# Patient Record
Sex: FEMALE | Race: WHITE | NOT HISPANIC OR LATINO | Employment: OTHER | URBAN - METROPOLITAN AREA
[De-identification: names, ages, dates, MRNs, and addresses within clinical notes are randomized per-mention and may not be internally consistent; named-entity substitution may affect disease eponyms.]

---

## 2023-03-08 ENCOUNTER — TELEPHONE (OUTPATIENT)
Dept: PRIMARY CARE | Facility: CLINIC | Age: 53
End: 2023-03-08
Payer: COMMERCIAL

## 2023-03-08 DIAGNOSIS — E27.1 ADDISON DISEASE (MULTI): Primary | ICD-10-CM

## 2023-03-08 NOTE — TELEPHONE ENCOUNTER
Pt left message that she has not been feeling well and asking for the solumedrol 125mg be sent in, the one that has liquid in it.

## 2023-03-09 ENCOUNTER — TELEPHONE (OUTPATIENT)
Dept: PRIMARY CARE | Facility: CLINIC | Age: 53
End: 2023-03-09
Payer: COMMERCIAL

## 2023-03-09 PROBLEM — N12 PYELONEPHRITIS: Status: ACTIVE | Noted: 2023-03-09

## 2023-03-09 PROBLEM — E03.8 HYPOTHYROIDISM DUE TO HASHIMOTO'S THYROIDITIS: Status: ACTIVE | Noted: 2023-03-09

## 2023-03-09 PROBLEM — I21.A1 TYPE 2 MYOCARDIAL INFARCTION WITHOUT ST ELEVATION (MULTI): Status: ACTIVE | Noted: 2023-03-09

## 2023-03-09 PROBLEM — E61.1 IRON DEFICIENCY: Status: ACTIVE | Noted: 2023-03-09

## 2023-03-09 PROBLEM — F43.0 PANIC ATTACK AS REACTION TO STRESS: Status: ACTIVE | Noted: 2023-03-09

## 2023-03-09 PROBLEM — E55.9 VITAMIN D DEFICIENCY: Status: ACTIVE | Noted: 2023-03-09

## 2023-03-09 PROBLEM — E27.1 ADDISON DISEASE (MULTI): Status: ACTIVE | Noted: 2023-03-09

## 2023-03-09 PROBLEM — R53.81 MALAISE AND FATIGUE: Status: ACTIVE | Noted: 2023-03-09

## 2023-03-09 PROBLEM — R79.89 ELEVATED SERUM CREATININE: Status: ACTIVE | Noted: 2023-03-09

## 2023-03-09 PROBLEM — E06.3 HYPOTHYROIDISM DUE TO HASHIMOTO'S THYROIDITIS: Status: ACTIVE | Noted: 2023-03-09

## 2023-03-09 PROBLEM — F51.5 NIGHTMARE DISORDER: Status: ACTIVE | Noted: 2023-03-09

## 2023-03-09 PROBLEM — D72.820 LYMPHOCYTOSIS: Status: ACTIVE | Noted: 2023-03-09

## 2023-03-09 PROBLEM — M54.31 BILATERAL SCIATICA: Status: ACTIVE | Noted: 2023-03-09

## 2023-03-09 PROBLEM — F32.2 SEVERE MAJOR DEPRESSION (MULTI): Status: ACTIVE | Noted: 2023-03-09

## 2023-03-09 PROBLEM — M54.32 BILATERAL SCIATICA: Status: ACTIVE | Noted: 2023-03-09

## 2023-03-09 PROBLEM — F41.0 PANIC ATTACK AS REACTION TO STRESS: Status: ACTIVE | Noted: 2023-03-09

## 2023-03-09 PROBLEM — R53.83 MALAISE AND FATIGUE: Status: ACTIVE | Noted: 2023-03-09

## 2023-03-09 PROBLEM — E87.6 HYPOKALEMIA: Status: ACTIVE | Noted: 2023-03-09

## 2023-03-09 RX ORDER — SERTRALINE HYDROCHLORIDE 50 MG/1
1 TABLET, FILM COATED ORAL DAILY
COMMUNITY
Start: 2021-05-05 | End: 2023-08-02 | Stop reason: ALTCHOICE

## 2023-03-09 RX ORDER — FLUDROCORTISONE ACETATE 0.1 MG/1
TABLET ORAL
COMMUNITY
End: 2024-01-22 | Stop reason: SDUPTHER

## 2023-03-09 RX ORDER — LEVOTHYROXINE SODIUM 150 UG/1
150 TABLET ORAL DAILY
COMMUNITY
End: 2023-09-27 | Stop reason: SDUPTHER

## 2023-03-09 RX ORDER — METHYLPREDNISOLONE SODIUM SUCCINATE 125 MG/2ML
125 INJECTION INTRAMUSCULAR; INTRAVENOUS ONCE
Qty: 2 ML | Refills: 3 | Status: SHIPPED | OUTPATIENT
Start: 2023-03-09 | End: 2023-03-09

## 2023-03-09 RX ORDER — VIT C/E/ZN/COPPR/LUTEIN/ZEAXAN 250MG-90MG
CAPSULE ORAL
COMMUNITY
End: 2023-11-16 | Stop reason: SDUPTHER

## 2023-03-09 RX ORDER — HYDROCORTISONE 5 MG/1
5 TABLET ORAL
COMMUNITY
End: 2023-08-02 | Stop reason: SDUPTHER

## 2023-03-09 RX ORDER — CYCLOBENZAPRINE HCL 10 MG
10 TABLET ORAL 3 TIMES DAILY PRN
COMMUNITY
End: 2023-07-03 | Stop reason: SDUPTHER

## 2023-03-09 RX ORDER — ERGOCALCIFEROL 1.25 MG/1
1 CAPSULE ORAL
COMMUNITY
Start: 2022-05-19 | End: 2023-11-16 | Stop reason: ALTCHOICE

## 2023-03-09 NOTE — TELEPHONE ENCOUNTER
Pt left message that she was at hospital and she has ulcerative colitis. They gave her an atb. Asking your suggestions if she needs to increase probiotic or anything else. Please advise.   Drysol Pregnancy And Lactation Text: This medication is considered safe during pregnancy and breast feeding.

## 2023-03-15 NOTE — TELEPHONE ENCOUNTER
Called Amanda and made sure she got my message that I left her. She did and said thank you for checking

## 2023-05-11 ENCOUNTER — TELEPHONE (OUTPATIENT)
Dept: PRIMARY CARE | Facility: CLINIC | Age: 53
End: 2023-05-11
Payer: COMMERCIAL

## 2023-05-11 DIAGNOSIS — J40 BRONCHITIS: Primary | ICD-10-CM

## 2023-05-11 NOTE — TELEPHONE ENCOUNTER
Abiodun left a message she states she is not feeling well she has a fever of 101 and is very congested, she is wondering if you would send something in for her?

## 2023-05-12 RX ORDER — ALBUTEROL SULFATE 90 UG/1
2 AEROSOL, METERED RESPIRATORY (INHALATION) EVERY 4 HOURS PRN
Qty: 8 G | Refills: 5 | Status: SHIPPED | OUTPATIENT
Start: 2023-05-12 | End: 2023-11-16 | Stop reason: ALTCHOICE

## 2023-05-12 RX ORDER — PREDNISONE 10 MG/1
TABLET ORAL
Qty: 42 TABLET | Refills: 0 | Status: SHIPPED | OUTPATIENT
Start: 2023-05-12 | End: 2023-05-24

## 2023-05-12 RX ORDER — BENZONATATE 200 MG/1
200 CAPSULE ORAL 3 TIMES DAILY PRN
Qty: 42 CAPSULE | Refills: 0 | Status: SHIPPED | OUTPATIENT
Start: 2023-05-12 | End: 2023-06-11

## 2023-05-12 RX ORDER — DOXYCYCLINE 100 MG/1
100 CAPSULE ORAL 2 TIMES DAILY
Qty: 20 CAPSULE | Refills: 0 | Status: SHIPPED | OUTPATIENT
Start: 2023-05-12 | End: 2023-05-22

## 2023-07-03 DIAGNOSIS — M54.31 BILATERAL SCIATICA: Primary | ICD-10-CM

## 2023-07-03 DIAGNOSIS — M54.32 BILATERAL SCIATICA: Primary | ICD-10-CM

## 2023-07-03 RX ORDER — CYCLOBENZAPRINE HCL 10 MG
10 TABLET ORAL 3 TIMES DAILY PRN
Qty: 90 TABLET | Refills: 0 | Status: SHIPPED | OUTPATIENT
Start: 2023-07-03 | End: 2023-11-16 | Stop reason: ALTCHOICE

## 2023-07-16 DIAGNOSIS — E27.1 ADDISON DISEASE (MULTI): Primary | ICD-10-CM

## 2023-07-16 DIAGNOSIS — M79.89 BILATERAL HAND SWELLING: ICD-10-CM

## 2023-07-16 RX ORDER — PREDNISONE 10 MG/1
TABLET ORAL
Qty: 21 TABLET | Refills: 0 | Status: SHIPPED | OUTPATIENT
Start: 2023-07-16 | End: 2023-07-22

## 2023-08-02 ENCOUNTER — OFFICE VISIT (OUTPATIENT)
Dept: PRIMARY CARE | Facility: CLINIC | Age: 53
End: 2023-08-02
Payer: COMMERCIAL

## 2023-08-02 ENCOUNTER — TELEPHONE (OUTPATIENT)
Dept: PRIMARY CARE | Facility: CLINIC | Age: 53
End: 2023-08-02

## 2023-08-02 VITALS
WEIGHT: 173 LBS | BODY MASS INDEX: 29.53 KG/M2 | SYSTOLIC BLOOD PRESSURE: 108 MMHG | OXYGEN SATURATION: 99 % | HEART RATE: 90 BPM | DIASTOLIC BLOOD PRESSURE: 78 MMHG | HEIGHT: 64 IN

## 2023-08-02 DIAGNOSIS — Z00.00 WELL ADULT EXAM: Primary | ICD-10-CM

## 2023-08-02 DIAGNOSIS — E27.1 ADDISON DISEASE (MULTI): ICD-10-CM

## 2023-08-02 DIAGNOSIS — Z12.31 BREAST CANCER SCREENING BY MAMMOGRAM: ICD-10-CM

## 2023-08-02 PROCEDURE — 99396 PREV VISIT EST AGE 40-64: CPT | Performed by: FAMILY MEDICINE

## 2023-08-02 RX ORDER — HYDROCORTISONE 5 MG/1
5 TABLET ORAL 3 TIMES DAILY
Qty: 90 TABLET | Refills: 11 | Status: SHIPPED | OUTPATIENT
Start: 2023-08-02 | End: 2024-02-25 | Stop reason: SDUPTHER

## 2023-08-02 RX ORDER — METHENAMINE HIPPURATE 1000 MG/1
1 TABLET ORAL 2 TIMES DAILY
COMMUNITY
End: 2023-08-02 | Stop reason: ALTCHOICE

## 2023-08-02 RX ORDER — METOCLOPRAMIDE 10 MG/1
10 TABLET ORAL 3 TIMES DAILY PRN
COMMUNITY
Start: 2023-03-09 | End: 2023-08-02 | Stop reason: ALTCHOICE

## 2023-08-02 NOTE — PROGRESS NOTES
Subjective   Patient ID: Amanda Hammonds is a 53 y.o. female who presents for Hand Pain.  HPI  Patient has had tingling and rash x 2 weeks.  She was on prednisone without relief.  She had peeling of the hands and foot.   It is improving.      Mammogram ordered  DEXA was ordered  Colonsocopy was done 2017    Diet: eating less than 1200 ravi   Review of Systems    Objective   Physical Exam  General: Patient is alert and oriented ×3 and appears in no acute distress. No respiratory distress.    Head: Atraumatic normocephalic.    Eyes: EOMI, PERRLA      Ears: Canals patent without any irritation, tympanic membranes without inflammation, no swelling, normal light reflex.    Nose: Nares patent. Turbinates are not swollen. No discharge.    Mouth: Normal mucosa. Moist. No erythema, exudates, tonsillar enlargement.    Neck: Normal range of motion, no masses.  Thyroid is palpable and normal in size without any nodules. No anterior cervical or posterior cervical adenopathy.    Heart: Regular rate and rhythm, no murmurs clicks or gallops    Lungs: Clear to auscultation bilaterally without any rhonchi rales or wheezing, lung sounds heard throughout all lung fields    Abdomen: Soft, nontender, no rigidity, rebound, guarding or organomegaly. Bowel sounds ×4 quadrants.    Musculoskeletal: Normal range of motion, strength is grossly intact in the proximal distal muscles of the upper and lower extremities bilaterally, deep tendon reflexes +2 out of 4 and symmetric bilaterally at the patella, Achilles, biceps, triceps, sensation intact.    Nerves: Cranial nerves II through XII appear grossly intact and without deficit    Skin: Intact, dry, no rashes or erythema    Psych: Normal affect.  Assessment/Plan   Problem List Items Addressed This Visit       [place] disease (CMS/HCC)    Relevant Medications    hydrocortisone (Cortef) 5 mg tablet     Other Visit Diagnoses       Well adult exam    -  Primary    Breast cancer screening by mammogram         Relevant Orders    BI mammo bilateral screening tomosynthesis        The patient is a 53-year-old female with multiple medical problems  [place]'s disease this followed by endocrinologist  Hypothyroidism currently controlled on Synthroid 150 mcg daily.  Need to recheck labs  Celiac disease which patient follows strict diet  Mammogram was ordered  Up-to-date on colon cancer screening with last colonoscopy June 19, 2017

## 2023-08-02 NOTE — TELEPHONE ENCOUNTER
----- Message from Manish Farah DO sent at 8/2/2023  8:29 AM EDT -----  Regarding: need copy of colonscopy  Please request colonoscopy from Dr Tang in [place]

## 2023-09-27 DIAGNOSIS — E06.3 HYPOTHYROIDISM DUE TO HASHIMOTO'S THYROIDITIS: Primary | ICD-10-CM

## 2023-09-27 DIAGNOSIS — E03.8 HYPOTHYROIDISM DUE TO HASHIMOTO'S THYROIDITIS: Primary | ICD-10-CM

## 2023-09-27 RX ORDER — LEVOTHYROXINE SODIUM 150 UG/1
150 TABLET ORAL DAILY
Qty: 90 TABLET | Refills: 3 | Status: SHIPPED | OUTPATIENT
Start: 2023-09-27 | End: 2024-02-25 | Stop reason: SDUPTHER

## 2023-11-15 ENCOUNTER — TELEPHONE (OUTPATIENT)
Dept: PRIMARY CARE | Facility: CLINIC | Age: 53
End: 2023-11-15
Payer: COMMERCIAL

## 2023-11-15 DIAGNOSIS — N30.01 ACUTE CYSTITIS WITH HEMATURIA: Primary | ICD-10-CM

## 2023-11-15 RX ORDER — LEVOFLOXACIN 500 MG/1
500 TABLET, FILM COATED ORAL DAILY
Qty: 10 TABLET | Refills: 0 | Status: SHIPPED | OUTPATIENT
Start: 2023-11-15 | End: 2023-11-16 | Stop reason: ALTCHOICE

## 2023-11-16 ENCOUNTER — OFFICE VISIT (OUTPATIENT)
Dept: PRIMARY CARE | Facility: CLINIC | Age: 53
End: 2023-11-16
Payer: COMMERCIAL

## 2023-11-16 VITALS
OXYGEN SATURATION: 97 % | WEIGHT: 174 LBS | SYSTOLIC BLOOD PRESSURE: 124 MMHG | TEMPERATURE: 97.8 F | HEIGHT: 64 IN | HEART RATE: 74 BPM | BODY MASS INDEX: 29.71 KG/M2 | DIASTOLIC BLOOD PRESSURE: 86 MMHG

## 2023-11-16 DIAGNOSIS — N39.0 URINARY TRACT INFECTION WITHOUT HEMATURIA, SITE UNSPECIFIED: ICD-10-CM

## 2023-11-16 DIAGNOSIS — E55.9 VITAMIN D DEFICIENCY: Primary | ICD-10-CM

## 2023-11-16 DIAGNOSIS — Z12.31 BREAST CANCER SCREENING BY MAMMOGRAM: ICD-10-CM

## 2023-11-16 DIAGNOSIS — N12 PYELONEPHRITIS: ICD-10-CM

## 2023-11-16 DIAGNOSIS — N39.0 RECURRENT UTI: ICD-10-CM

## 2023-11-16 LAB
POC APPEARANCE, URINE: CLEAR
POC BILIRUBIN, URINE: NEGATIVE
POC BLOOD, URINE: NEGATIVE
POC COLOR, URINE: YELLOW
POC GLUCOSE, URINE: NEGATIVE MG/DL
POC KETONES, URINE: NEGATIVE MG/DL
POC LEUKOCYTES, URINE: ABNORMAL
POC NITRITE,URINE: NEGATIVE
POC PH, URINE: 5.5 PH
POC PROTEIN, URINE: NEGATIVE MG/DL
POC SPECIFIC GRAVITY, URINE: 1.01
POC UROBILINOGEN, URINE: 0.2 EU/DL

## 2023-11-16 PROCEDURE — 1036F TOBACCO NON-USER: CPT | Performed by: FAMILY MEDICINE

## 2023-11-16 PROCEDURE — 87086 URINE CULTURE/COLONY COUNT: CPT

## 2023-11-16 PROCEDURE — 99213 OFFICE O/P EST LOW 20 MIN: CPT | Performed by: FAMILY MEDICINE

## 2023-11-16 PROCEDURE — 81003 URINALYSIS AUTO W/O SCOPE: CPT | Performed by: FAMILY MEDICINE

## 2023-11-16 RX ORDER — ONDANSETRON 4 MG/1
4 TABLET, ORALLY DISINTEGRATING ORAL EVERY 6 HOURS PRN
COMMUNITY
Start: 2023-11-01 | End: 2023-12-14 | Stop reason: SDUPTHER

## 2023-11-16 RX ORDER — VIT C/E/ZN/COPPR/LUTEIN/ZEAXAN 250MG-90MG
50 CAPSULE ORAL DAILY
Qty: 60 CAPSULE | Refills: 11 | Status: SHIPPED | OUTPATIENT
Start: 2023-11-16 | End: 2024-11-10

## 2023-11-16 ASSESSMENT — PATIENT HEALTH QUESTIONNAIRE - PHQ9
1. LITTLE INTEREST OR PLEASURE IN DOING THINGS: NOT AT ALL
2. FEELING DOWN, DEPRESSED OR HOPELESS: NOT AT ALL
SUM OF ALL RESPONSES TO PHQ9 QUESTIONS 1 AND 2: 0

## 2023-11-16 NOTE — PROGRESS NOTES
Subjective   Patient ID: Amanda Hammonds is a 53 y.o. female who presents for Follow-up (Had a UTI and the back pain never went away and the pressure is still there . ).  HPI  Patient is coming the office today after she had had a urinary tract infection that was found at the emergency department.  She refused CAT scan at that time.  Has had issues with pyelonephritis in the past.  Coming to the office today after being treated with cephalexin and states that she still having issues.  Admits to some flank pain on the right.  She also has a history of kidney stones.  Urinalysis was done in the office and it only showed some small white blood cells.  She denies any dysuria.  Admits to increased frequency.  Has not been drinking a lot of water.  Review of Systems    Objective   Physical Exam    Assessment/Plan   Problem List Items Addressed This Visit       Pyelonephritis    Relevant Orders    Referral to Urology    Vitamin D deficiency - Primary    Relevant Medications    cholecalciferol (Vitamin D-3) 25 MCG (1000 UT) capsule     Other Visit Diagnoses       Urinary tract infection without hematuria, site unspecified        Relevant Orders    POCT UA Automated manually resulted (Completed)    Referral to Urology    Recurrent UTI            Patient was started on levofloxacin  Sent her to urologist Dr. Herr  Urine was sent for culture  Patient was instructed that if she develops any nausea, vomiting, fever that she should go to the nearest emergency department.  She states understanding.  Her  was with her and states that he will also make sure that this happens.

## 2023-11-17 ENCOUNTER — TELEPHONE (OUTPATIENT)
Dept: PRIMARY CARE | Facility: CLINIC | Age: 53
End: 2023-11-17
Payer: COMMERCIAL

## 2023-11-17 DIAGNOSIS — N12 PYELONEPHRITIS: Primary | ICD-10-CM

## 2023-11-17 LAB — BACTERIA UR CULT: NORMAL

## 2023-11-17 RX ORDER — LEVOFLOXACIN 500 MG/1
500 TABLET, FILM COATED ORAL DAILY
Qty: 10 TABLET | Refills: 0 | Status: SHIPPED | OUTPATIENT
Start: 2023-11-17 | End: 2023-11-27

## 2023-11-17 NOTE — TELEPHONE ENCOUNTER
Pt left message on voicemail that she was in yesterday for a UTI, a script was suppose to be called in but when she went to the pharmacy, they said it was cancelled.  Are you able to send in a script for her, she states she is feeling miserable.

## 2023-11-29 ENCOUNTER — TELEPHONE (OUTPATIENT)
Dept: PRIMARY CARE | Facility: CLINIC | Age: 53
End: 2023-11-29
Payer: COMMERCIAL

## 2023-11-29 DIAGNOSIS — F32.2 SEVERE MAJOR DEPRESSION (MULTI): Primary | ICD-10-CM

## 2023-11-29 RX ORDER — SERTRALINE HYDROCHLORIDE 50 MG/1
50 TABLET, FILM COATED ORAL DAILY
Qty: 30 TABLET | Refills: 1 | Status: SHIPPED | OUTPATIENT
Start: 2023-11-29 | End: 2024-05-17 | Stop reason: WASHOUT

## 2023-11-29 NOTE — TELEPHONE ENCOUNTER
Eric called and said she needs to go back on an anti depressant. She is getting the blues again and no motivation. Please send to [facility] [place].

## 2023-12-14 ENCOUNTER — TELEPHONE (OUTPATIENT)
Dept: PRIMARY CARE | Facility: CLINIC | Age: 53
End: 2023-12-14
Payer: COMMERCIAL

## 2023-12-14 DIAGNOSIS — E27.1 ADDISON DISEASE (MULTI): Primary | ICD-10-CM

## 2023-12-14 RX ORDER — ONDANSETRON 4 MG/1
4 TABLET, ORALLY DISINTEGRATING ORAL EVERY 6 HOURS PRN
Qty: 20 TABLET | Refills: 0 | Status: SHIPPED | OUTPATIENT
Start: 2023-12-14

## 2024-01-22 DIAGNOSIS — E27.1 ADDISON DISEASE (MULTI): Primary | ICD-10-CM

## 2024-01-22 NOTE — TELEPHONE ENCOUNTER
Amanda called asking if you could refill her rx for Fludrocortisone, she does have an appt with her endocrinologist but its not until feb. 29th . So they can start filling it for her after that.  To [facility] in [place]

## 2024-01-23 RX ORDER — FLUDROCORTISONE ACETATE 0.1 MG/1
0.1 TABLET ORAL DAILY
Qty: 30 TABLET | Refills: 0 | Status: SHIPPED | OUTPATIENT
Start: 2024-01-23 | End: 2024-02-25 | Stop reason: SDUPTHER

## 2024-02-01 ENCOUNTER — TELEPHONE (OUTPATIENT)
Dept: PRIMARY CARE | Facility: CLINIC | Age: 54
End: 2024-02-01
Payer: COMMERCIAL

## 2024-02-12 ENCOUNTER — OFFICE VISIT (OUTPATIENT)
Dept: PRIMARY CARE | Facility: CLINIC | Age: 54
End: 2024-02-12
Payer: COMMERCIAL

## 2024-02-12 VITALS
DIASTOLIC BLOOD PRESSURE: 84 MMHG | BODY MASS INDEX: 27.83 KG/M2 | HEIGHT: 64 IN | OXYGEN SATURATION: 95 % | HEART RATE: 73 BPM | TEMPERATURE: 97.8 F | WEIGHT: 163 LBS | SYSTOLIC BLOOD PRESSURE: 126 MMHG

## 2024-02-12 DIAGNOSIS — E27.1 ADDISON DISEASE (MULTI): ICD-10-CM

## 2024-02-12 DIAGNOSIS — M99.03 SEGMENTAL AND SOMATIC DYSFUNCTION OF LUMBAR REGION: ICD-10-CM

## 2024-02-12 DIAGNOSIS — R40.0 DAYTIME SOMNOLENCE: ICD-10-CM

## 2024-02-12 DIAGNOSIS — M99.02 SEGMENTAL AND SOMATIC DYSFUNCTION OF THORACIC REGION: ICD-10-CM

## 2024-02-12 DIAGNOSIS — M25.551 BILATERAL HIP PAIN: ICD-10-CM

## 2024-02-12 DIAGNOSIS — E87.6 HYPOKALEMIA: ICD-10-CM

## 2024-02-12 DIAGNOSIS — E55.9 VITAMIN D DEFICIENCY: ICD-10-CM

## 2024-02-12 DIAGNOSIS — G47.34 HYPOXIA, SLEEP RELATED: ICD-10-CM

## 2024-02-12 DIAGNOSIS — M99.05 SEGMENTAL AND SOMATIC DYSFUNCTION OF PELVIC REGION: ICD-10-CM

## 2024-02-12 DIAGNOSIS — M99.04 SEGMENTAL AND SOMATIC DYSFUNCTION OF SACRAL REGION: ICD-10-CM

## 2024-02-12 DIAGNOSIS — M99.06 SEGMENTAL AND SOMATIC DYSFUNCTION OF LOWER EXTREMITY: ICD-10-CM

## 2024-02-12 DIAGNOSIS — R06.83 SNORING: ICD-10-CM

## 2024-02-12 DIAGNOSIS — D72.820 LYMPHOCYTOSIS: Primary | ICD-10-CM

## 2024-02-12 DIAGNOSIS — M25.552 BILATERAL HIP PAIN: ICD-10-CM

## 2024-02-12 PROCEDURE — 1036F TOBACCO NON-USER: CPT | Performed by: FAMILY MEDICINE

## 2024-02-12 PROCEDURE — 98927 OSTEOPATH MANJ 5-6 REGIONS: CPT | Performed by: FAMILY MEDICINE

## 2024-02-12 PROCEDURE — 99214 OFFICE O/P EST MOD 30 MIN: CPT | Performed by: FAMILY MEDICINE

## 2024-02-12 RX ORDER — ONDANSETRON 4 MG/1
TABLET, FILM COATED ORAL
COMMUNITY
Start: 2023-12-19 | End: 2024-02-12 | Stop reason: ALTCHOICE

## 2024-02-12 RX ORDER — CYCLOBENZAPRINE HCL 10 MG
1 TABLET ORAL 3 TIMES DAILY PRN
COMMUNITY
End: 2024-02-12 | Stop reason: ALTCHOICE

## 2024-02-12 RX ORDER — MELOXICAM 15 MG/1
TABLET ORAL
COMMUNITY
Start: 2023-12-10 | End: 2024-02-12 | Stop reason: ALTCHOICE

## 2024-02-12 RX ORDER — METOCLOPRAMIDE 10 MG/1
10 TABLET ORAL 4 TIMES DAILY PRN
COMMUNITY
Start: 2024-02-04 | End: 2024-05-17 | Stop reason: WASHOUT

## 2024-02-12 NOTE — PATIENT INSTRUCTIONS
Xclear- used several times daily  NOW- Allibiotic 1 cap 2 x day.  If start to feel sick take 2-3 caps 3 x day  Vitamin D  Vitamin C  Homeopathic: Aconitum 30 C at the very first signs of infection  Immune System Stimulator- 1-2 sptrays 2 x day

## 2024-02-12 NOTE — PROGRESS NOTES
Subjective   Patient ID: Amanda Hammonds is a 54 y.o. female who presents for Follow-up (Follow up from hospital for covid/Needs hips adjusted. ).  HPI  Patient having pain in the hips.      Immunoprotection.   Vitamin C and Vitamin D.    Review of Systems    Objective   Physical Exam  General: Patient is alert and orient x3 and appears in no acute distress.  Some pain distress.    Neck: Decreased range of motion in all planes    Heart: Regular rate and rhythm no murmurs clicks or gallops    Lungs: Clear to auscultation bilaterally without rhonchi rales or wheezing      Musculoskeletal: Strength was grossly intact.  Deep tendon reflexes intact.  Sensation intact.  Decreased range of motion    Osteopathic structural:    In the thoracic region  T2 was extended rotated right side bent right, T7 flexed rotated right side bent right  In the lumbar region L5 extended rotated right side bent right  In the pelvic region  positive pelvic compression test on the right, ASIS inferior the right, PSIS superior on the right  In the sacral region  posterior sacral sulcus on the left, EDISON posterior on the left, negative spring test  In the lower extremity SCS tenderpoint left piriformis    Assessment/Plan   Problem List Items Addressed This Visit       [place] disease (CMS/HCC)    Hypokalemia    Relevant Orders    Comprehensive Metabolic Panel    Magnesium    Lymphocytosis - Primary    Relevant Orders    CBC and Auto Differential    Vitamin D deficiency    Relevant Orders    Vitamin D 1,25 Dihydroxy (for eval of hypercalcemia)     Other Visit Diagnoses       Bilateral hip pain        Relevant Orders    Sedimentation Rate    High sensitivity CRP    Daytime somnolence        Relevant Orders    Home sleep apnea test (HSAT)    Snoring        Relevant Orders    Home sleep apnea test (HSAT)    Hypoxia, sleep related        Relevant Orders    Home sleep apnea test (HSAT)    Segmental and somatic dysfunction of lower extremity         Segmental and somatic dysfunction of lumbar region        Segmental and somatic dysfunction of pelvic region        Segmental and somatic dysfunction of sacral region        Segmental and somatic dysfunction of thoracic region              Xclear- used several times daily  NOW- Allibiotic 1 cap 2 x day.  If start to feel sick take 2-3 caps 3 x day  Vitamin D  Vitamin C  Homeopathic: Aconitum 30 C at the very first signs of infection  Immune System Stimulator- 1-2 sprays 2 x day      Hypokalemia  - Most likely result of diarrhea and vomiting    Osteopathic manipulative therapy was utilized  In the Thoracics as high velocity low amplitude thrust and muscle energy  In the lumbar region as functional positional release  In the pelvic region as muscle energy  In the sacral region as muscle energy  In the lower extremity as strain counterstrain    Patient tolerated the procedure well and noticed improvement after the treatment.    Instructed to drink plenty of water    Suggested Epsom salt bath    Can take Arnica    Exercises given    Snoring and hypoxia noted at night Daytime somnolence

## 2024-02-20 ENCOUNTER — APPOINTMENT (OUTPATIENT)
Dept: PRIMARY CARE | Facility: CLINIC | Age: 54
End: 2024-02-20
Payer: COMMERCIAL

## 2024-02-22 ENCOUNTER — OFFICE VISIT (OUTPATIENT)
Dept: ENDOCRINOLOGY | Facility: CLINIC | Age: 54
End: 2024-02-22
Payer: COMMERCIAL

## 2024-02-22 VITALS
BODY MASS INDEX: 28.17 KG/M2 | SYSTOLIC BLOOD PRESSURE: 116 MMHG | WEIGHT: 165 LBS | HEIGHT: 64 IN | DIASTOLIC BLOOD PRESSURE: 82 MMHG | HEART RATE: 87 BPM

## 2024-02-22 DIAGNOSIS — E06.3 HYPOTHYROIDISM DUE TO HASHIMOTO'S THYROIDITIS: ICD-10-CM

## 2024-02-22 DIAGNOSIS — E03.8 HYPOTHYROIDISM DUE TO HASHIMOTO'S THYROIDITIS: ICD-10-CM

## 2024-02-22 DIAGNOSIS — M81.0 OSTEOPOROSIS, UNSPECIFIED OSTEOPOROSIS TYPE, UNSPECIFIED PATHOLOGICAL FRACTURE PRESENCE: ICD-10-CM

## 2024-02-22 DIAGNOSIS — E27.1 ADDISON DISEASE (MULTI): Primary | ICD-10-CM

## 2024-02-22 PROCEDURE — 99205 OFFICE O/P NEW HI 60 MIN: CPT | Performed by: INTERNAL MEDICINE

## 2024-02-22 PROCEDURE — 1036F TOBACCO NON-USER: CPT | Performed by: INTERNAL MEDICINE

## 2024-02-22 NOTE — PROGRESS NOTES
"Elijah Hammonds is a 54 y.o. female who I am asked to see in consultation for evaluation of thyroid function and [place]'s disease.  She is accompanied by her  today.    The patient states that she was diagnosed with [place]'s disease in 2020. At that time she was passing out, having frequent emesis and her weight was under 100 pounds. She was last seen in here in 2020.      She admits to having hospital admission every other month.  She typically will go to [facility].  Her last hospitalization was earlier this month.      Her hospitalizations are as such:  February 4, 2024  December 7-1, 2023  November 2023  May 2023  March 2023  December 2022    Current Regimen  Hydrocortisone twice daily  Florinef 0.1mg once daily   Synthroid 150mcg once daily      Symptoms are as listed below:  Energy levels - fatigue worse in the afaternoons; worse at 2pm   Daytime naps - admits; she baby sits her grandson   Dizziness - admits   Sweats - admits   Presyncope - admits; she lies down to prevent passing out  Nausea - admits  Abdominal pain - from dehydration  Bowel movements - once daily; regular  consistency   Abdominal pain - few times per month     Weight - desires to be 130 lbs     Other autoimmune history:  Sjorgen's, Celiac disease             Review of Systems   Constitutional:  Positive for fatigue.   HENT:          Dry mouth    Eyes:         Dry eye    Respiratory:          Snoring    Gastrointestinal:  Positive for abdominal pain, nausea and vomiting. Negative for diarrhea.   Endocrine:        Night sweats   Musculoskeletal:  Positive for arthralgias, back pain and myalgias.   Skin:         Dry skin    Neurological:  Positive for dizziness and weakness.   All other systems reviewed and are negative.      Objective   /82 (BP Location: Left arm, Patient Position: Sitting, BP Cuff Size: Adult)   Pulse 87   Ht 1.626 m (5' 4\")   Wt 74.8 kg (165 lb)   BMI 28.32 kg/m²    Physical " Exam  Vitals and nursing note reviewed.   Constitutional:       General: She is not in acute distress.     Appearance: Normal appearance. She is normal weight.   HENT:      Head: Normocephalic and atraumatic.      Nose: Nose normal.      Mouth/Throat:      Mouth: Mucous membranes are moist.   Eyes:      Extraocular Movements: Extraocular movements intact.   Cardiovascular:      Rate and Rhythm: Normal rate and regular rhythm.   Pulmonary:      Effort: Pulmonary effort is normal.      Breath sounds: Normal breath sounds.   Musculoskeletal:         General: Normal range of motion.   Skin:     General: Skin is warm.   Neurological:      Mental Status: She is alert and oriented to person, place, and time.   Psychiatric:         Mood and Affect: Mood normal.       BONE DENSITY SCAN 5/23/23  FN -2.0  Right hip -2.1  Total hip -1.7  LS -2.9       Assessment/Plan   54-year-old  female presents with evaluation and further management of [place]'s disease which was diagnosed in 2000.     She also has Hashimoto's thyroiditis.     She is found to have osteoporosis as of May 2023    [place] disease (CMS/[facility])  To continue Florinef 0.1mg once daily   To change Hydrocortisone to 10mg at 8AM, 5pm at 12pm and 5mg at 5pm   To double doses of Hydrocortisone in the event of an acute illness  For Solumedrol rescue shot in the event the above fail   To get a new medical alert bracelet   To obtain blood tests   Packet given regarding sick day instructions  Counseled that her frequent hospitalizations demonstrate the fact that she is chronically underdosed    Hypothyroidism due to Hashimoto's thyroiditis  To continue Synthroid 150mcg po daily  Take Synthroid on an empty stomach with water alone, 30-60 minutes before eating or taking other medications, 4 hours before any calcium or iron supplement.  To obtain TFTs      Osteoporosis  To commence Fosamax 70mg once weekly only after dental work is complete   To obtain Vitamin D  level    For follow up in 3 months

## 2024-02-22 NOTE — PATIENT INSTRUCTIONS
Thank you for choosing [facility]  for your health care needs.  If you have any questions, concerns or medical needs, please feel free to contact our office at [phone].    Please ensure you complete your blood work one week before the next scheduled appointment.    To continue Synthroid 150mcg po daily  Take Synthroid on an empty stomach with water alone, 30-60 minutes before eating or taking other medications, 4 hours before any calcium or iron supplement.  To continue Florinef 0.1mg once daily   To change Hydrocortisone to 10mg at 8AM, 5pm at 12pm and 5mg at 5pm   To double doses of Hydrocortisone in the event of an acute illness  For Solumedrol rescue shot in the event the above fail   To get a new medical alert bracelet   To obtain blood tests   To commence Fosamax 70mg once weekly only after dental work is complete   For follow up in 3 months

## 2024-02-25 PROBLEM — M81.0 OSTEOPOROSIS: Status: ACTIVE | Noted: 2024-02-25

## 2024-02-25 RX ORDER — HYDROCORTISONE SODIUM SUCCINATE 100 MG/2ML
100 INJECTION, POWDER, FOR SOLUTION INTRAMUSCULAR; INTRAVENOUS EVERY 8 HOURS PRN
Qty: 5 EACH | Refills: 11 | Status: SHIPPED | OUTPATIENT
Start: 2024-02-25 | End: 2025-02-24

## 2024-02-25 RX ORDER — HYDROCORTISONE 5 MG/1
TABLET ORAL
Qty: 200 TABLET | Refills: 6 | Status: SHIPPED | OUTPATIENT
Start: 2024-02-25 | End: 2024-02-25 | Stop reason: SDUPTHER

## 2024-02-25 RX ORDER — HYDROCORTISONE 5 MG/1
TABLET ORAL
Qty: 200 TABLET | Refills: 6 | Status: SHIPPED | OUTPATIENT
Start: 2024-02-25

## 2024-02-25 RX ORDER — LEVOTHYROXINE SODIUM 150 UG/1
150 TABLET ORAL DAILY
Qty: 90 TABLET | Refills: 1 | Status: SHIPPED | OUTPATIENT
Start: 2024-02-25 | End: 2024-03-11

## 2024-02-25 RX ORDER — FLUDROCORTISONE ACETATE 0.1 MG/1
0.1 TABLET ORAL DAILY
Qty: 90 TABLET | Refills: 1 | Status: SHIPPED | OUTPATIENT
Start: 2024-02-25 | End: 2024-08-23

## 2024-02-25 ASSESSMENT — ENCOUNTER SYMPTOMS
ENDOCRINE COMMENTS: NIGHT SWEATS
MYALGIAS: 1
DIARRHEA: 0
BACK PAIN: 1
ARTHRALGIAS: 1
VOMITING: 1
DIZZINESS: 1
FATIGUE: 1
ABDOMINAL PAIN: 1
ROS SKIN COMMENTS: DRY SKIN
NAUSEA: 1
WEAKNESS: 1

## 2024-02-25 NOTE — ASSESSMENT & PLAN NOTE
To continue Florinef 0.1mg once daily   To change Hydrocortisone to 10mg at 8AM, 5pm at 12pm and 5mg at 5pm   To double doses of Hydrocortisone in the event of an acute illness  For Solumedrol rescue shot in the event the above fail   To get a new medical alert bracelet   To obtain blood tests   Packet given regarding sick day instructions  Counseled that her frequent hospitalizations demonstrate the fact that she is chronically underdosed

## 2024-02-25 NOTE — ASSESSMENT & PLAN NOTE
To continue Synthroid 150mcg po daily  Take Synthroid on an empty stomach with water alone, 30-60 minutes before eating or taking other medications, 4 hours before any calcium or iron supplement.  To obtain TFTs

## 2024-02-25 NOTE — ASSESSMENT & PLAN NOTE
To commence Fosamax 70mg once weekly only after dental work is complete   To obtain Vitamin D level    For follow up in 3 months

## 2024-02-27 ENCOUNTER — APPOINTMENT (OUTPATIENT)
Dept: ENDOCRINOLOGY | Facility: CLINIC | Age: 54
End: 2024-02-27
Payer: COMMERCIAL

## 2024-02-28 ENCOUNTER — TELEPHONE (OUTPATIENT)
Dept: PRIMARY CARE | Facility: CLINIC | Age: 54
End: 2024-02-28
Payer: COMMERCIAL

## 2024-02-28 NOTE — TELEPHONE ENCOUNTER
Amanda called and said she took a home test and she has another UTI. She has called before and Dr. Farah has sent an antibiotic in for her. Can you do that for her?

## 2024-03-01 ENCOUNTER — TELEPHONE (OUTPATIENT)
Dept: PRIMARY CARE | Facility: CLINIC | Age: 54
End: 2024-03-01
Payer: COMMERCIAL

## 2024-03-01 DIAGNOSIS — N39.0 ACUTE UTI: Primary | ICD-10-CM

## 2024-03-01 RX ORDER — NITROFURANTOIN 25; 75 MG/1; MG/1
100 CAPSULE ORAL 2 TIMES DAILY
Qty: 14 CAPSULE | Refills: 0 | Status: SHIPPED | OUTPATIENT
Start: 2024-03-01 | End: 2024-03-06 | Stop reason: SDUPTHER

## 2024-03-06 ENCOUNTER — OFFICE VISIT (OUTPATIENT)
Dept: PRIMARY CARE | Facility: CLINIC | Age: 54
End: 2024-03-06
Payer: COMMERCIAL

## 2024-03-06 VITALS
HEIGHT: 64 IN | BODY MASS INDEX: 28.17 KG/M2 | SYSTOLIC BLOOD PRESSURE: 138 MMHG | HEART RATE: 73 BPM | OXYGEN SATURATION: 99 % | WEIGHT: 165 LBS | RESPIRATION RATE: 16 BRPM | DIASTOLIC BLOOD PRESSURE: 80 MMHG

## 2024-03-06 DIAGNOSIS — M99.02 SEGMENTAL AND SOMATIC DYSFUNCTION OF THORACIC REGION: ICD-10-CM

## 2024-03-06 DIAGNOSIS — M99.04 SEGMENTAL AND SOMATIC DYSFUNCTION OF SACRAL REGION: ICD-10-CM

## 2024-03-06 DIAGNOSIS — M99.05 SEGMENTAL AND SOMATIC DYSFUNCTION OF PELVIC REGION: ICD-10-CM

## 2024-03-06 DIAGNOSIS — G89.29 CHRONIC BILATERAL LOW BACK PAIN WITHOUT SCIATICA: Primary | ICD-10-CM

## 2024-03-06 DIAGNOSIS — M99.07 SEGMENTAL AND SOMATIC DYSFUNCTION OF UPPER EXTREMITY: ICD-10-CM

## 2024-03-06 DIAGNOSIS — M99.00 SEGMENTAL AND SOMATIC DYSFUNCTION OF HEAD REGION: ICD-10-CM

## 2024-03-06 DIAGNOSIS — M99.01 SEGMENTAL AND SOMATIC DYSFUNCTION OF CERVICAL REGION: ICD-10-CM

## 2024-03-06 DIAGNOSIS — M99.08 SEGMENTAL AND SOMATIC DYSFUNCTION OF RIB CAGE: ICD-10-CM

## 2024-03-06 DIAGNOSIS — N39.0 ACUTE UTI: ICD-10-CM

## 2024-03-06 DIAGNOSIS — M54.50 CHRONIC BILATERAL LOW BACK PAIN WITHOUT SCIATICA: Primary | ICD-10-CM

## 2024-03-06 DIAGNOSIS — M99.06 SEGMENTAL AND SOMATIC DYSFUNCTION OF LOWER EXTREMITY: ICD-10-CM

## 2024-03-06 DIAGNOSIS — M99.03 SEGMENTAL AND SOMATIC DYSFUNCTION OF LUMBAR REGION: ICD-10-CM

## 2024-03-06 PROBLEM — N17.9 ACUTE RENAL FAILURE (CMS-HCC): Status: ACTIVE | Noted: 2024-03-06

## 2024-03-06 PROBLEM — R50.9 FEVER WITH CHILLS: Status: ACTIVE | Noted: 2024-03-06

## 2024-03-06 PROBLEM — R10.9 ABDOMINAL PAIN: Status: ACTIVE | Noted: 2023-03-09

## 2024-03-06 PROBLEM — U07.1 DISEASE DUE TO SEVERE ACUTE RESPIRATORY SYNDROME CORONAVIRUS 2 (SARS-COV-2): Status: ACTIVE | Noted: 2024-03-06

## 2024-03-06 PROBLEM — R79.89 ELEVATED TROPONIN I LEVEL: Status: ACTIVE | Noted: 2024-03-06

## 2024-03-06 PROBLEM — A08.4 VIRAL GASTROENTERITIS: Status: ACTIVE | Noted: 2024-03-06

## 2024-03-06 PROBLEM — H92.02 LEFT EAR PAIN: Status: ACTIVE | Noted: 2024-03-06
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
## 2024-03-06 PROBLEM — Z86.39 HISTORY OF ENDOCRINE DISORDER: Status: ACTIVE | Noted: 2024-03-06

## 2024-03-06 PROBLEM — L23.7 CONTACT DERMATITIS DUE TO POISON IVY: Status: ACTIVE | Noted: 2024-03-06
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
## 2024-03-06 PROBLEM — Z20.822 SUSPECTED SEVERE ACUTE RESPIRATORY SYNDROME CORONAVIRUS 2 (SARS-COV-2) INFECTION: Status: ACTIVE | Noted: 2024-03-06

## 2024-03-06 PROBLEM — K52.9 COLITIS: Status: ACTIVE | Noted: 2024-03-06

## 2024-03-06 PROBLEM — J20.9 ACUTE BRONCHITIS: Status: ACTIVE | Noted: 2024-03-06

## 2024-03-06 PROBLEM — J01.90 ACUTE RHINOSINUSITIS: Status: ACTIVE | Noted: 2024-03-06

## 2024-03-06 PROBLEM — R35.0 INCREASED FREQUENCY OF URINATION: Status: ACTIVE | Noted: 2018-02-15

## 2024-03-06 PROBLEM — K90.0 CELIAC DISEASE (HHS-HCC): Status: ACTIVE | Noted: 2024-03-06

## 2024-03-06 PROBLEM — R11.0 NAUSEA: Status: ACTIVE | Noted: 2023-03-09

## 2024-03-06 PROCEDURE — 99213 OFFICE O/P EST LOW 20 MIN: CPT | Performed by: FAMILY MEDICINE

## 2024-03-06 PROCEDURE — 1036F TOBACCO NON-USER: CPT | Performed by: FAMILY MEDICINE

## 2024-03-06 PROCEDURE — 98928 OSTEOPATH MANJ 7-8 REGIONS: CPT | Performed by: FAMILY MEDICINE

## 2024-03-06 RX ORDER — NITROFURANTOIN 25; 75 MG/1; MG/1
100 CAPSULE ORAL 2 TIMES DAILY
Qty: 14 CAPSULE | Refills: 0 | Status: SHIPPED | OUTPATIENT
Start: 2024-03-06 | End: 2024-03-13

## 2024-03-06 RX ORDER — AZITHROMYCIN 250 MG/1
TABLET, FILM COATED ORAL
COMMUNITY
Start: 2019-09-03 | End: 2024-05-17 | Stop reason: WASHOUT

## 2024-03-06 ASSESSMENT — PATIENT HEALTH QUESTIONNAIRE - PHQ9
SUM OF ALL RESPONSES TO PHQ9 QUESTIONS 1 AND 2: 0
1. LITTLE INTEREST OR PLEASURE IN DOING THINGS: NOT AT ALL
2. FEELING DOWN, DEPRESSED OR HOPELESS: NOT AT ALL

## 2024-03-06 NOTE — PROGRESS NOTES
Subjective   Patient ID: Amanda Hammonds is a 54 y.o. female who presents for back and hip pain..  HPI  Chief complaint of low back pain.  This has been chronic.  Patient would like to try manipulation for.  Denies any loss of bowel or bladder control, groin paresthesias, pain numbness or tingling shooting down the extremities  Review of Systems    Objective   Physical Exam  General: Patient is alert and orient x3 and appears in no acute distress.  Some pain distress.    Neck: Decreased range of motion in all planes    Heart: Regular rate and rhythm no murmurs clicks or gallops    Lungs: Clear to auscultation bilaterally without rhonchi rales or wheezing      Musculoskeletal: Strength was grossly intact.  Deep tendon reflexes intact.  Sensation intact.  Decreased range of motion    Osteopathic structural:  In the head region there is increased suboccipital tension  In the cervical region C2 extended rotated right side bent left  In the rib region first rib on the left was tender to palpation resisted exhalation  In the upper extremity  right upper extremity was protracted inferior tension of pectoralis minor  In the thoracic region  T2 was extended rotated right side bent right, T7 flexed rotated right side bent right  In the lumbar region L5 extended rotated right side bent right  In the pelvic region  positive pelvic compression test on the right, ASIS inferior the right, PSIS superior on the right  In the sacral region  posterior sacral sulcus on the left, EDISON posterior on the left, negative spring test  In the lower extremity SCS tenderpoint left piriformis    Assessment/Plan   Problem List Items Addressed This Visit       Low back pain - Primary     Other Visit Diagnoses       Acute UTI        Segmental and somatic dysfunction of cervical region        Segmental and somatic dysfunction of head region        Segmental and somatic dysfunction of lumbar region        Segmental and somatic dysfunction of lower  extremity        Segmental and somatic dysfunction of pelvic region        Segmental and somatic dysfunction of rib cage        Segmental and somatic dysfunction of sacral region        Segmental and somatic dysfunction of thoracic region        Segmental and somatic dysfunction of upper extremity            Osteopathic manipulative therapy was utilized  In the head region as suboccipital release  In the cervical region as functional positional release  In the upper extremity as muscle energy  In the rib region as functional positional release  In the Thoracics as high velocity low amplitude thrust and muscle energy  In the lumbar region as functional positional release  In the pelvic region as muscle energy  In the sacral region as muscle energy  In the lower extremity as strain counterstrain    Patient tolerated the procedure well and noticed improvement after the treatment.    Instructed to drink plenty of water  Nitrofurantoin for UTI  Exercises given

## 2024-03-11 ENCOUNTER — TELEPHONE (OUTPATIENT)
Dept: ENDOCRINOLOGY | Facility: CLINIC | Age: 54
End: 2024-03-11
Payer: COMMERCIAL

## 2024-03-11 DIAGNOSIS — E06.3 HYPOTHYROIDISM DUE TO HASHIMOTO'S THYROIDITIS: Primary | ICD-10-CM

## 2024-03-11 DIAGNOSIS — E03.8 HYPOTHYROIDISM DUE TO HASHIMOTO'S THYROIDITIS: Primary | ICD-10-CM

## 2024-03-11 RX ORDER — LEVOTHYROXINE SODIUM 125 UG/1
125 TABLET ORAL DAILY
Qty: 30 TABLET | Refills: 5 | Status: SHIPPED | OUTPATIENT
Start: 2024-03-11 | End: 2024-09-07

## 2024-03-11 NOTE — TELEPHONE ENCOUNTER
Result Communication    Labs have been reviewed.  The thyroid levels are much to high.  Please decrease Levothyroxine to 125mcg daily.  For follow up as scheduled.

## 2024-04-10 ENCOUNTER — TELEPHONE (OUTPATIENT)
Dept: PRIMARY CARE | Facility: CLINIC | Age: 54
End: 2024-04-10
Payer: COMMERCIAL

## 2024-04-10 DIAGNOSIS — R30.0 DYSURIA: Primary | ICD-10-CM

## 2024-04-10 NOTE — TELEPHONE ENCOUNTER
Amanda called stating she has been feeling crappy lately. She wants to know if you could send a lab order over for a UTI  to [facility]? Dp lab work and urine since she is feeling so bad. She takes her pills and she is waking up and has terrible back pain. So she said something is not right.

## 2024-05-16 ASSESSMENT — ENCOUNTER SYMPTOMS
DIZZINESS: 1
MYALGIAS: 1
ABDOMINAL PAIN: 1
ARTHRALGIAS: 1
NAUSEA: 1
WEAKNESS: 1
VOMITING: 1
BACK PAIN: 1
FATIGUE: 1
DIARRHEA: 0
ROS SKIN COMMENTS: DRY SKIN
ENDOCRINE COMMENTS: NIGHT SWEATS

## 2024-05-17 ENCOUNTER — OFFICE VISIT (OUTPATIENT)
Dept: ENDOCRINOLOGY | Facility: CLINIC | Age: 54
End: 2024-05-17
Payer: COMMERCIAL

## 2024-05-17 VITALS
SYSTOLIC BLOOD PRESSURE: 126 MMHG | DIASTOLIC BLOOD PRESSURE: 84 MMHG | BODY MASS INDEX: 28.54 KG/M2 | HEART RATE: 64 BPM | WEIGHT: 167.2 LBS | HEIGHT: 64 IN

## 2024-05-17 DIAGNOSIS — E06.3 HYPOTHYROIDISM DUE TO HASHIMOTO'S THYROIDITIS: ICD-10-CM

## 2024-05-17 DIAGNOSIS — M81.0 OSTEOPOROSIS, UNSPECIFIED OSTEOPOROSIS TYPE, UNSPECIFIED PATHOLOGICAL FRACTURE PRESENCE: ICD-10-CM

## 2024-05-17 DIAGNOSIS — E03.8 HYPOTHYROIDISM DUE TO HASHIMOTO'S THYROIDITIS: ICD-10-CM

## 2024-05-17 DIAGNOSIS — E27.1 ADDISON DISEASE (MULTI): Primary | ICD-10-CM

## 2024-05-17 PROCEDURE — 99214 OFFICE O/P EST MOD 30 MIN: CPT | Performed by: INTERNAL MEDICINE

## 2024-05-17 RX ORDER — POTASSIUM CHLORIDE 20 MEQ/1
1 TABLET, EXTENDED RELEASE ORAL
COMMUNITY
Start: 2024-04-14

## 2024-05-17 RX ORDER — FERROUS GLUCONATE 324(38)MG
TABLET ORAL
COMMUNITY
Start: 2024-05-16

## 2024-05-17 NOTE — PROGRESS NOTES
Subjective   Amanda Hammonds is a 54 y.o. female who presents for follow up for hypothyroidism  and [place]'s disease.  She is accompanied by her  today.    The patient states that she was diagnosed with [place]'s disease in 2020. At that time she was passing out, having frequent emesis and her weight was under 100 pounds.     She admits to having hospital admission every other month.  She typically will go to [facility].  Her last hospitalization was earlier this month.      Her hospitalizations are as such:  February 4, 2024  December 7-1, 2023  November 2023  May 2023  March 2023  December 2022    She was recently hospitalized for a kidney infection.  She was recommended to be in palliative care where she will be able to get infusions of IVF and potassium if needed at the infusion center as opposed to being admitted at [facility]. She gets back pain, nauseda nad dizziness, sweats  before crisis        Current Regimen  Hydrocortisone 10-5 daily  Florinef 0.1mg once daily   Synthroid 125mcg once daily     Symptoms are as listed below:  Energy levels - varies  Daytime naps - admits; she baby sits her grandson on Sunday; for 2 hours   Dizziness - denies    Sweats - admits   Presyncope - admits; she lies down to prevent passing out  Nausea - admits  Abdominal pain - from dehydration  Bowel movements - denies diarrhea from abx  Tremors - denies   Palpitaitons - denies   Abdominal pain - few times per month     Weight - desires to be 130 lbs     Other autoimmune history:  Sjorgen's, Celiac disease      Review of Systems   Constitutional:  Positive for fatigue.   HENT:  Positive for dental problem and tinnitus.         Dry mouth    Eyes:         Dry eye    Respiratory:          Snoring    Gastrointestinal:  Positive for abdominal pain, nausea and vomiting. Negative for diarrhea.   Endocrine:        Night sweats   Musculoskeletal:  Positive for arthralgias, back pain and myalgias.   Skin:         Dry  "skin    Neurological:  Positive for dizziness and weakness.   All other systems reviewed and are negative.      Objective   /84 (BP Location: Left arm, Patient Position: Sitting)   Pulse 64   Ht 1.626 m (5' 4\")   Wt 75.8 kg (167 lb 3.2 oz)   BMI 28.70 kg/m²    Physical Exam  Vitals and nursing note reviewed.   Constitutional:       General: She is not in acute distress.     Appearance: Normal appearance. She is normal weight.   HENT:      Head: Normocephalic and atraumatic.      Nose: Nose normal.      Mouth/Throat:      Mouth: Mucous membranes are moist.   Eyes:      Extraocular Movements: Extraocular movements intact.   Cardiovascular:      Rate and Rhythm: Normal rate and regular rhythm.   Pulmonary:      Effort: Pulmonary effort is normal.      Breath sounds: Normal breath sounds.   Musculoskeletal:         General: Normal range of motion.   Skin:     General: Skin is warm.   Neurological:      Mental Status: She is alert and oriented to person, place, and time.   Psychiatric:         Mood and Affect: Mood normal.       BONE DENSITY SCAN 5/23/23  FN -2.0  Right hip -2.1  Total hip -1.7  LS -2.9       Assessment/Plan   54-year-old  female presents for follow up for [place]'s disease which was diagnosed in 2000 and Hashimoto's thyroiditis.     She is found to have osteoporosis as of May 2023    Hypothyroidism due to Hashimoto's thyroiditis  To continue Synthroid 125mcg po daily  Take Synthroid on an empty stomach with water alone, 30-60 minutes before eating or taking other medications, 4 hours before any calcium or iron supplement.    [place] disease (Multi)  To continue Florinef 0.1mg once daily   To change Hydrocortisone to 10mg at 8AM, 10pm at 12pm and 5mg at 5pm   To double doses of Hydrocortisone in the event of an acute illness  For Solumedrol rescue shot in the event the above fail   To get a new medical alert bracelet   To obtain blood tests   Will send orders to infusion center at " [place]       Osteoporosis  To commence Fosamax 70mg once weekly only after dental work is complete     For follow up in 4 months

## 2024-05-17 NOTE — PATIENT INSTRUCTIONS
Thank you for choosing [facility][1] [facility]  for your health care needs.  If you have any questions, concerns or medical needs, please feel free to contact our office at [phone].    Please ensure you complete your blood work one week before the next scheduled appointment.    To continue Synthroid 125mcg po daily  Take Synthroid on an empty stomach with water alone, 30-60 minutes before eating or taking other medications, 4 hours before any calcium or iron supplement.  To continue Florinef 0.1mg once daily   To change Hydrocortisone to 10mg at 8AM, 10pm at 12pm and 5mg at 5pm   To double doses of Hydrocortisone in the event of an acute illness  For Solumedrol rescue shot in the event the above fail   To get a new medical alert bracelet   To obtain blood tests   To commence Fosamax 70mg once weekly only after dental work is complete   Will send orders to infusion center at [place]   For follow up in 4 months

## 2024-05-22 ENCOUNTER — TELEPHONE (OUTPATIENT)
Dept: ENDOCRINOLOGY | Facility: CLINIC | Age: 54
End: 2024-05-22

## 2024-05-22 RX ORDER — METHYLPREDNISOLONE SODIUM SUCCINATE 125 MG/2ML
125 INJECTION INTRAMUSCULAR; INTRAVENOUS ONCE
Qty: 2 ML | Refills: 5 | Status: SHIPPED | OUTPATIENT
Start: 2024-05-22 | End: 2024-05-22

## 2024-05-22 NOTE — ASSESSMENT & PLAN NOTE
To continue Florinef 0.1mg once daily   To change Hydrocortisone to 10mg at 8AM, 10pm at 12pm and 5mg at 5pm   To double doses of Hydrocortisone in the event of an acute illness  For Solumedrol rescue shot in the event the above fail   To get a new medical alert bracelet   To obtain blood tests   Will send orders to infusion center at [place]

## 2024-05-22 NOTE — TELEPHONE ENCOUNTER
PHARMACY CALLED TO CLARIFY MEDICATION DOSE FOR THE METHYIPREDNISOLONE, RX SHOULD STATE TO INJECT 1ML OF 125MG DOSE INSTEAD OF TO INJECT 2ML DOSE

## 2024-05-22 NOTE — ASSESSMENT & PLAN NOTE
To continue Synthroid 125mcg po daily  Take Synthroid on an empty stomach with water alone, 30-60 minutes before eating or taking other medications, 4 hours before any calcium or iron supplement.

## 2024-05-22 NOTE — ASSESSMENT & PLAN NOTE
To commence Fosamax 70mg once weekly only after dental work is complete     For follow up in 4 months

## 2024-06-11 ENCOUNTER — TELEPHONE (OUTPATIENT)
Dept: ENDOCRINOLOGY | Facility: CLINIC | Age: 54
End: 2024-06-11

## 2024-09-20 ENCOUNTER — APPOINTMENT (OUTPATIENT)
Dept: ENDOCRINOLOGY | Facility: CLINIC | Age: 54
End: 2024-09-20
Payer: COMMERCIAL

## 2024-09-20 VITALS
HEIGHT: 64 IN | DIASTOLIC BLOOD PRESSURE: 68 MMHG | HEART RATE: 69 BPM | SYSTOLIC BLOOD PRESSURE: 116 MMHG | WEIGHT: 165.6 LBS | BODY MASS INDEX: 28.27 KG/M2

## 2024-09-20 DIAGNOSIS — E06.3 HYPOTHYROIDISM DUE TO HASHIMOTO'S THYROIDITIS: ICD-10-CM

## 2024-09-20 DIAGNOSIS — M81.0 OSTEOPOROSIS, UNSPECIFIED OSTEOPOROSIS TYPE, UNSPECIFIED PATHOLOGICAL FRACTURE PRESENCE: Primary | ICD-10-CM

## 2024-09-20 DIAGNOSIS — E03.8 HYPOTHYROIDISM DUE TO HASHIMOTO'S THYROIDITIS: ICD-10-CM

## 2024-09-20 DIAGNOSIS — E27.1 ADDISON DISEASE (MULTI): ICD-10-CM

## 2024-09-20 PROCEDURE — 99214 OFFICE O/P EST MOD 30 MIN: CPT | Performed by: INTERNAL MEDICINE

## 2024-09-20 PROCEDURE — 1036F TOBACCO NON-USER: CPT | Performed by: INTERNAL MEDICINE

## 2024-09-20 PROCEDURE — 3008F BODY MASS INDEX DOCD: CPT | Performed by: INTERNAL MEDICINE

## 2024-09-20 RX ORDER — ALENDRONATE SODIUM 70 MG/1
70 TABLET ORAL
Qty: 4 TABLET | Refills: 11 | Status: SHIPPED | OUTPATIENT
Start: 2024-09-20 | End: 2025-09-20

## 2024-09-20 RX ORDER — LEVOTHYROXINE SODIUM 112 UG/1
112 TABLET ORAL DAILY
Qty: 30 TABLET | Refills: 5 | Status: SHIPPED | OUTPATIENT
Start: 2024-09-20 | End: 2025-03-19

## 2024-09-20 RX ORDER — HYDROCORTISONE 5 MG/1
TABLET ORAL
Qty: 200 TABLET | Refills: 6 | Status: SHIPPED | OUTPATIENT
Start: 2024-09-20

## 2024-09-20 RX ORDER — FLUDROCORTISONE ACETATE 0.1 MG/1
0.1 TABLET ORAL DAILY
Qty: 90 TABLET | Refills: 1 | Status: SHIPPED | OUTPATIENT
Start: 2024-09-20 | End: 2024-09-20 | Stop reason: SDUPTHER

## 2024-09-20 RX ORDER — LEVOTHYROXINE SODIUM 125 UG/1
125 TABLET ORAL DAILY
Qty: 30 TABLET | Refills: 5 | Status: SHIPPED | OUTPATIENT
Start: 2024-09-20 | End: 2024-09-20 | Stop reason: DRUGHIGH

## 2024-09-20 RX ORDER — FLUDROCORTISONE ACETATE 0.1 MG/1
0.1 TABLET ORAL DAILY
Qty: 90 TABLET | Refills: 1 | Status: SHIPPED | OUTPATIENT
Start: 2024-09-20 | End: 2025-03-19

## 2024-09-20 RX ORDER — HYDROCORTISONE 5 MG/1
TABLET ORAL
Qty: 200 TABLET | Refills: 6 | Status: SHIPPED | OUTPATIENT
Start: 2024-09-20 | End: 2024-09-20 | Stop reason: SDUPTHER

## 2024-09-20 ASSESSMENT — ENCOUNTER SYMPTOMS: DIAPHORESIS: 1

## 2024-09-20 NOTE — PATIENT INSTRUCTIONS
Thank you for choosing [facility]  for your health care needs.  If you have any questions, concerns or medical needs, please feel free to contact our office at [phone].    Please ensure you complete your blood work one week before the next scheduled appointment.    To decrease Synthroid to 112mcg po daily  Take Synthroid on an empty stomach with water alone, 30-60 minutes before eating or taking other medications, 4 hours before any calcium or iron supplement.  To continue Florinef 0.1mg once daily   To continue Hydrocortisone 10mg at 8AM, 10pm at 12pm and 5mg at 5pm   To double doses of Hydrocortisone in the event of an acute illness  For Solumedrol rescue shot in the event the above fail   To get a new medical alert bracelet   To obtain blood tests   To commence Fosamax 70mg once weekly   For follow up in 6 months

## 2024-09-20 NOTE — ASSESSMENT & PLAN NOTE
To decrease Synthroid to 112mcg po daily  Take Synthroid on an empty stomach with water alone, 30-60 minutes before eating or taking other medications, 4 hours before any calcium or iron supplement.

## 2024-09-20 NOTE — ASSESSMENT & PLAN NOTE
To commence Fosamax 70mg once weekly   For repeat bone density scan in May 2025    For follow up in 6 months

## 2024-09-20 NOTE — PROGRESS NOTES
"Elijah Hammonds is a 54 y.o. female who presents for follow up for hypothyroidism  and Cumming's disease.      The patient states that she was diagnosed with Tulio's disease in 2020. At that time she was passing out, having frequent emesis and her weight was under 100 pounds.     She admits to having hospital admission every other month.  She typically will go to [facility].    Her hospitalizations are as such:  August 2, 2024  February 4, 2024  December 7-1, 2023  November 2023  May 2023  March 2023  December 2022    She has been feeling quite well since her last appointment and with dose adjustments.       Current Regimen  Hydrocortisone 10-10-5 three times daily   Florinef 0.1mg once daily   Synthroid 125mcg once daily     Symptoms are as listed below:  Energy levels - much improved    Daytime naps - admits; severeal hours on a bad day; 30 mins on a good day  Salt cravings - denies   Dizziness - admits; has tinnitus   Syncope - denies     Presyncope - admits; with rining in her ears   Nausea - denies   Abdominal pain - denies  Bowel movements - regular; normal consistency   Mood - has anxiety   Tremors - denies   Palpitaitons - denies   Tremors - denies   Skin changes- light spots to legs     Other autoimmune history:  Sjorgen's, Celiac disease        Review of Systems   Constitutional:  Positive for diaphoresis (Night sweats).   HENT:  Positive for tinnitus (To right ear). Negative for hearing loss.    All other systems reviewed and are negative.      Objective   /68 (BP Location: Left arm, Patient Position: Sitting, BP Cuff Size: Adult)   Pulse 69   Ht 1.626 m (5' 4\")   Wt 75.1 kg (165 lb 9.6 oz)   BMI 28.43 kg/m²    Physical Exam  Vitals and nursing note reviewed.   Constitutional:       General: She is not in acute distress.     Appearance: Normal appearance. She is normal weight.   HENT:      Head: Normocephalic and atraumatic.      Nose: Nose normal.      Mouth/Throat:      " Mouth: Mucous membranes are moist.   Eyes:      Extraocular Movements: Extraocular movements intact.   Cardiovascular:      Rate and Rhythm: Normal rate and regular rhythm.   Pulmonary:      Effort: Pulmonary effort is normal.      Breath sounds: Normal breath sounds.   Musculoskeletal:         General: Normal range of motion.   Skin:     General: Skin is warm.   Neurological:      Mental Status: She is alert and oriented to person, place, and time.   Psychiatric:         Mood and Affect: Mood normal.     Lab Review              Imaging  BONE DENSITY SCAN 5/23/23  FN -2.0  Right hip -2.1  Total hip -1.7  LS -2.9        Assessment/Plan   54-year-old female presents for follow up for Berks's disease which was diagnosed in 2000 and Hashimoto's thyroiditis.     She is found to have osteoporosis as of May 2023    Hypothyroidism due to Hashimoto's thyroiditis  To decrease Synthroid to 112mcg po daily  Take Synthroid on an empty stomach with water alone, 30-60 minutes before eating or taking other medications, 4 hours before any calcium or iron supplement.    Berks disease (Multi)  To continue Florinef 0.1mg once daily   To continue Hydrocortisone 10mg at 8AM, 10pm at 12pm and 5mg at 5pm   To double doses of Hydrocortisone in the event of an acute illness  For Solumedrol rescue shot in the event the above fail   To get a new medical alert bracelet       Osteoporosis  To commence Fosamax 70mg once weekly   For repeat bone density scan in May 2025    For follow up in 6 months

## 2024-09-20 NOTE — ASSESSMENT & PLAN NOTE
To continue Florinef 0.1mg once daily   To continue Hydrocortisone 10mg at 8AM, 10pm at 12pm and 5mg at 5pm   To double doses of Hydrocortisone in the event of an acute illness  For Solumedrol rescue shot in the event the above fail   To get a new medical alert paula

## 2025-03-21 ENCOUNTER — APPOINTMENT (OUTPATIENT)
Dept: ENDOCRINOLOGY | Facility: CLINIC | Age: 55
End: 2025-03-21
Payer: COMMERCIAL

## 2025-04-04 DIAGNOSIS — E06.3 HYPOTHYROIDISM DUE TO HASHIMOTO'S THYROIDITIS: ICD-10-CM

## 2025-04-04 DIAGNOSIS — E27.1 ADDISON DISEASE (MULTI): ICD-10-CM

## 2025-04-04 RX ORDER — FLUDROCORTISONE ACETATE 0.1 MG/1
0.1 TABLET ORAL DAILY
Qty: 90 TABLET | Refills: 1 | Status: SHIPPED | OUTPATIENT
Start: 2025-04-04 | End: 2025-10-01

## 2025-04-04 RX ORDER — LEVOTHYROXINE SODIUM 112 UG/1
112 TABLET ORAL DAILY
Qty: 90 TABLET | Refills: 1 | Status: SHIPPED | OUTPATIENT
Start: 2025-04-04 | End: 2025-10-01

## 2025-04-04 RX ORDER — HYDROCORTISONE 5 MG/1
TABLET ORAL
Qty: 200 TABLET | Refills: 6 | Status: SHIPPED | OUTPATIENT
Start: 2025-04-04

## 2025-04-04 NOTE — TELEPHONE ENCOUNTER
Amanda would like a refill on Synthroid 112 mcg, hydrocortisone 5 mg, and fludrocortisone 0.1 mg.    Next appointment 8/8/25

## 2025-04-24 ENCOUNTER — TELEPHONE (OUTPATIENT)
Dept: ENDOCRINOLOGY | Facility: CLINIC | Age: 55
End: 2025-04-24

## 2025-04-24 NOTE — TELEPHONE ENCOUNTER
(Next appt 8/8/2025)    Patient state hydrocortisone is constantly on backorder, she is asking if you can switch the rx to prednisone, please send to [facility].

## 2025-05-12 ENCOUNTER — APPOINTMENT (OUTPATIENT)
Dept: PRIMARY CARE | Facility: CLINIC | Age: 55
End: 2025-05-12
Payer: COMMERCIAL

## 2025-05-12 VITALS
HEART RATE: 75 BPM | HEIGHT: 64 IN | BODY MASS INDEX: 30.39 KG/M2 | DIASTOLIC BLOOD PRESSURE: 70 MMHG | OXYGEN SATURATION: 97 % | TEMPERATURE: 97.5 F | SYSTOLIC BLOOD PRESSURE: 106 MMHG | WEIGHT: 178 LBS

## 2025-05-12 DIAGNOSIS — Z23 ENCOUNTER FOR IMMUNIZATION: ICD-10-CM

## 2025-05-12 DIAGNOSIS — R21 BLISTERING RASH: ICD-10-CM

## 2025-05-12 DIAGNOSIS — G89.29 CHRONIC BILATERAL LOW BACK PAIN WITHOUT SCIATICA: ICD-10-CM

## 2025-05-12 DIAGNOSIS — I21.A1 TYPE 2 MYOCARDIAL INFARCTION WITHOUT ST ELEVATION (MULTI): ICD-10-CM

## 2025-05-12 DIAGNOSIS — Z78.0 POSTMENOPAUSAL: Primary | ICD-10-CM

## 2025-05-12 DIAGNOSIS — E27.1 ADDISON DISEASE (MULTI): ICD-10-CM

## 2025-05-12 DIAGNOSIS — E06.3 HYPOTHYROIDISM DUE TO HASHIMOTO'S THYROIDITIS: ICD-10-CM

## 2025-05-12 DIAGNOSIS — M79.605 BILATERAL LEG PAIN: ICD-10-CM

## 2025-05-12 DIAGNOSIS — K90.0 CELIAC DISEASE (HHS-HCC): ICD-10-CM

## 2025-05-12 DIAGNOSIS — E55.9 VITAMIN D DEFICIENCY: ICD-10-CM

## 2025-05-12 DIAGNOSIS — E87.6 HYPOKALEMIA: ICD-10-CM

## 2025-05-12 DIAGNOSIS — M79.604 BILATERAL LEG PAIN: ICD-10-CM

## 2025-05-12 DIAGNOSIS — M54.50 CHRONIC BILATERAL LOW BACK PAIN WITHOUT SCIATICA: ICD-10-CM

## 2025-05-12 DIAGNOSIS — D72.820 LYMPHOCYTOSIS: ICD-10-CM

## 2025-05-12 DIAGNOSIS — Z12.31 BREAST CANCER SCREENING BY MAMMOGRAM: ICD-10-CM

## 2025-05-12 PROCEDURE — 1036F TOBACCO NON-USER: CPT | Performed by: FAMILY MEDICINE

## 2025-05-12 PROCEDURE — 3008F BODY MASS INDEX DOCD: CPT | Performed by: FAMILY MEDICINE

## 2025-05-12 PROCEDURE — 99214 OFFICE O/P EST MOD 30 MIN: CPT | Performed by: FAMILY MEDICINE

## 2025-05-12 RX ORDER — HYDROCORTISONE 5 MG/1
TABLET ORAL
Qty: 642.85 TABLET | Refills: 6 | Status: SHIPPED | OUTPATIENT
Start: 2025-05-12

## 2025-05-12 NOTE — PROGRESS NOTES
Subjective   Patient ID: Amanda Hammonds is a 55 y.o. female who presents for Follow-up (Get labs done/Legs aching ).  HPI  History of Present Illness  The patient presents for evaluation of leg pain, skin blisters, osteoporosis, and thyroid issues.    She reports experiencing deep, aching pain in her lower legs, which she describes as similar to shin splints. There is no associated leg swelling. She has a family history of varicose veins, with her mother having similar symptoms.    She has been experiencing blister-like lesions on her body, including between her breasts, for the past 1 to 2 years. These lesions are painful and take approximately 4 weeks to heal. She also reports a history of impetigo on her face and hand, foot, and mouth disease, which causes severe peeling of her hands and feet during illness.    She is currently on a weekly medication regimen for osteoporosis and is seeking advice on whether it can be taken concurrently with her thyroid medication.    She believes her thyroid levels may be slightly low due to significant weight gain over the past year. She reports being very active at home, but when traveling, she does not have as much to do. She works 10 to 12-hour days and spends most of her time sitting in an [employer]nb. Even if she takes a walk, it does not compare to her usual daily activities.    In April 2024, she was diagnosed with either bone cancer or leukemia. She did not complete the blood work for bone cancer. She was sent to palliative care and saw another doctor, a kidney doctor, and a cancer doctor. She was informed that she had signed paperwork transferring her care, which she thought was just for palliative care. She believes that if it were bone cancer, she would be feeling worse by now. Her legs have been hurting, but she thinks it is due to her potassium levels. She avoids taking too much potassium. She was seeing an oncologist who recently left, and now a nurse practitioner has  taken over. She had another appointment last month but was unable to attend due to travel. She rescheduled and cannot be seen until August 2025. She requested prednisone instead of hydrocortisone but did not receive a response. Hydrocortisone has always been out of stock, and she has been taking fewer pills. She has not tried mail-order pharmacy. She cannot take [employer] brand due to the fillers. Palliative care had her on pain meds and lorazepam for anxiety. She thinks lorazepam is a good idea but does not like the pain meds as they worsened her body pains. She takes Advil and Tylenol daily to help her sleep, alternating to avoid damage to her kidneys and liver. She stays hydrated by drinking plenty of water. She feels like she always has inflammation and struggles to sleep through the night. Her shoulder and hip hurt, and she seizes up when sitting down. She has noticed increasing weakness over the last three years, especially in the summertime, affecting her ability to lift objects. She keeps getting blister-like lesions on her body that take forever to heal. These lesions appear suddenly, cause pain before they show up, and take about 4 weeks to heal. She had impetigo on her face and hand, foot, and mouth disease, which causes severe peeling of her hands and feet during illness. She is supposed to take bone medicine once a week for osteoporosis, ensuring it does not interfere with her thyroid medication. She can take the bone medicine with a big glass of water and avoid lying down for half an hour after taking it. The thyroid medication should be taken at least an hour before eating to ensure better absorption.    FAMILY HISTORY  Her mother had a stroke at 21 and is in a wheelchair or sometimes uses a cane for short distances.     Results       Review of Systems    Objective      Physical Exam  Physical Exam  General: Patient is alert and oriented appears in pain distress.  No respiratory distress.  Eyes:  EOMI, PERRLA  Neck: No adenopathy, thyroid normal in size   respiratory: Clear to auscultation, no wheezing, rales or rhonchi  Cardiovascular: Regular rate and rhythm, no murmurs, rubs, or gallops  Extremities: Presence of varicose veins in lower extremities, no swelling.  Tenderness over the anterior tibia bilaterally  Skin: Currently the patient has a few scattered skin lesions that are scabbed over.  The picture that she had showed us showed a blisterlike lesion on a erythematous base  Assessment/Plan   Assessment & Plan  1. Leg pain.  - The patient reports deep aching pain in both lower legs, which feels like shin splints.  - Varicose veins are present but are soft and compressible, which is a good sign.  - An x-ray of the legs will be ordered to further investigate the cause of the pain.  - The patient will be monitored for any swelling in the legs.    2. Skin blisters.  - The patient has been experiencing blister-like lesions that take about 4 weeks to heal.  - These lesions appear painful and may be viral in nature, possibly disseminated shingles.  - The patient is advised to document these rashes and send pictures for further evaluation.  - If a new blister appears, the patient should contact the office for a swab test to identify the virus.    3. Osteoporosis.  - The patient is advised to continue taking the bone medication once a week with a large glass of water.  - The patient should avoid lying down for 30 minutes post-administration.  - The timing of the osteoporosis medication in relation to thyroid medication was discussed.  - The patient is reassured that taking the osteoporosis medication will not interfere with thyroid medication absorption.    4. Thyroid issues.  - A thyroid function test will be ordered to check current levels.  - The patient will be monitored for any symptoms indicating thyroid dysfunction.  - The importance of taking thyroid medication at least an hour before eating was  reiterated.     Problem List Items Addressed This Visit       Ramsey disease (Multi)    Relevant Medications    hydrocortisone (Cortef) 5 mg tablet    Other Relevant Orders    CBC and Auto Differential    Comprehensive Metabolic Panel    Hemoglobin A1C    Thyroid Stimulating Hormone    Triiodothyronine, Free    Thyroxine, Free    Sedimentation Rate    Vitamin D 25-Hydroxy,Total (for eval of Vitamin D levels)    Cortisol    ACTH    Magnesium    High sensitivity CRP    Hypokalemia    Relevant Orders    CBC and Auto Differential    Comprehensive Metabolic Panel    Hemoglobin A1C    Thyroid Stimulating Hormone    Triiodothyronine, Free    Thyroxine, Free    Sedimentation Rate    Vitamin D 25-Hydroxy,Total (for eval of Vitamin D levels)    Cortisol    ACTH    Magnesium    High sensitivity CRP    Hypothyroidism due to Hashimoto's thyroiditis    Relevant Orders    CBC and Auto Differential    Comprehensive Metabolic Panel    Hemoglobin A1C    Thyroid Stimulating Hormone    Triiodothyronine, Free    Thyroxine, Free    Sedimentation Rate    Vitamin D 25-Hydroxy,Total (for eval of Vitamin D levels)    Cortisol    ACTH    Magnesium    High sensitivity CRP    Lymphocytosis    Relevant Orders    CBC and Auto Differential    Comprehensive Metabolic Panel    Hemoglobin A1C    Thyroid Stimulating Hormone    Triiodothyronine, Free    Thyroxine, Free    Sedimentation Rate    Vitamin D 25-Hydroxy,Total (for eval of Vitamin D levels)    Cortisol    ACTH    Magnesium    High sensitivity CRP    Type 2 myocardial infarction without ST elevation (Multi)    Vitamin D deficiency    Relevant Orders    CBC and Auto Differential    Comprehensive Metabolic Panel    Hemoglobin A1C    Thyroid Stimulating Hormone    Triiodothyronine, Free    Thyroxine, Free    Sedimentation Rate    Vitamin D 25-Hydroxy,Total (for eval of Vitamin D levels)    Cortisol    ACTH    Magnesium    High sensitivity CRP    Celiac disease ([facility]-H[facility])    Low back pain      Other Visit Diagnoses         Postmenopausal    -  Primary      Encounter for immunization          Breast cancer screening by mammogram        Relevant Orders    BI mammo bilateral screening tomosynthesis      Bilateral leg pain        Relevant Orders    XR tibia fibula left 2 views    XR tibia fibula right 2 views      Blistering rash                This medical note was created with the assistance of artificial intelligence (AI) for documentation purposes. The content has been reviewed and confirmed by the healthcare provider for accuracy and completeness. Patient consented to the use of audio recording and use of AI during their visit.

## 2025-06-11 ENCOUNTER — TELEPHONE (OUTPATIENT)
Dept: PRIMARY CARE | Facility: CLINIC | Age: 55
End: 2025-06-11
Payer: COMMERCIAL

## 2025-06-11 NOTE — TELEPHONE ENCOUNTER
Pt art she is wondering if there was anything concerning on her blood work, she is still not feeling well, they are scanned into the system from 5/30/25

## 2025-06-23 ENCOUNTER — TELEPHONE (OUTPATIENT)
Dept: PRIMARY CARE | Facility: CLINIC | Age: 55
End: 2025-06-23
Payer: COMMERCIAL

## 2025-06-23 NOTE — TELEPHONE ENCOUNTER
Pt mom art stating frankie is very sick, she has been running a fever of 102, coughing and sore throat, she is wanting to know if you are able to send something in for her?

## 2025-08-08 ENCOUNTER — APPOINTMENT (OUTPATIENT)
Dept: ENDOCRINOLOGY | Facility: CLINIC | Age: 55
End: 2025-08-08
Payer: COMMERCIAL

## 2025-08-08 VITALS
BODY MASS INDEX: 29.4 KG/M2 | WEIGHT: 172.2 LBS | HEART RATE: 74 BPM | DIASTOLIC BLOOD PRESSURE: 84 MMHG | SYSTOLIC BLOOD PRESSURE: 124 MMHG | HEIGHT: 64 IN

## 2025-08-08 DIAGNOSIS — E27.1 ADDISON DISEASE (MULTI): ICD-10-CM

## 2025-08-08 DIAGNOSIS — M81.0 OSTEOPOROSIS, UNSPECIFIED OSTEOPOROSIS TYPE, UNSPECIFIED PATHOLOGICAL FRACTURE PRESENCE: Primary | ICD-10-CM

## 2025-08-08 DIAGNOSIS — E06.3 HYPOTHYROIDISM DUE TO HASHIMOTO'S THYROIDITIS: ICD-10-CM

## 2025-08-08 PROCEDURE — 99214 OFFICE O/P EST MOD 30 MIN: CPT | Performed by: INTERNAL MEDICINE

## 2025-08-08 PROCEDURE — 3008F BODY MASS INDEX DOCD: CPT | Performed by: INTERNAL MEDICINE

## 2025-08-08 RX ORDER — LEVOTHYROXINE SODIUM 112 UG/1
112 TABLET ORAL DAILY
Qty: 90 TABLET | Refills: 1 | Status: SHIPPED | OUTPATIENT
Start: 2025-08-08 | End: 2026-02-04

## 2025-08-08 RX ORDER — ALENDRONATE SODIUM 70 MG/1
70 TABLET ORAL
Qty: 4 TABLET | Refills: 11 | Status: SHIPPED | OUTPATIENT
Start: 2025-08-08 | End: 2026-08-08

## 2025-08-08 RX ORDER — HYDROCORTISONE 5 MG/1
TABLET ORAL
Qty: 642.85 TABLET | Refills: 6 | Status: SHIPPED | OUTPATIENT
Start: 2025-08-08 | End: 2025-08-09

## 2025-08-08 RX ORDER — FLUDROCORTISONE ACETATE 0.1 MG/1
0.1 TABLET ORAL NIGHTLY
Qty: 90 TABLET | Refills: 1 | Status: SHIPPED | OUTPATIENT
Start: 2025-08-08 | End: 2026-02-04

## 2025-08-08 NOTE — PROGRESS NOTES
"Elijah Hammonds is a 55 y.o. female who presents for follow up for hypothyroidism  and Wilton's disease.      The patient states that she was diagnosed with Wilton's disease in 2020. At that time she was passing out, having frequent emesis and her weight was under 100 pounds.     She has recently had hospitalizations for:  Pneumonia  UTI on at least two occasions  These all lead to adrenal crisis as well.      She has had a few injections of Solumedrol due to emesis and feeling unwell.      Current Regimen  Hydrocortisone 10-10-5 three times daily   Florinef 0.1mg once daily   Synthroid 112mcg once daily     Symptoms are as listed below:  Energy levels - can get fatigued; dizzy, hot and tired with a shower   Salt craving - denies   Nausea - occasionally    Syncope - denies     Presyncope - admits  Abdominal pain - denies  Bowel movements - occasional diarrhea; once every two weeks   Mood - has anxiety   Tremors - denies   Palpitaitons - denies   Cramps - admits     Other autoimmune history:  Sjorgen's, Celiac disease          Review of Systems   Cardiovascular:  Negative for leg swelling.   Genitourinary:         Nocturia x 3   Neurological:  Positive for dizziness.   All other systems reviewed and are negative.      Objective   /84   Pulse 74   Ht 1.626 m (5' 4\")   Wt 78.1 kg (172 lb 3.2 oz)   BMI 29.56 kg/m²    Physical Exam  Vitals and nursing note reviewed.   Constitutional:       General: She is not in acute distress.     Appearance: Normal appearance. She is normal weight.   HENT:      Head: Normocephalic and atraumatic.      Nose: Nose normal.      Mouth/Throat:      Mouth: Mucous membranes are moist.     Eyes:      Extraocular Movements: Extraocular movements intact.       Cardiovascular:      Rate and Rhythm: Normal rate and regular rhythm.   Pulmonary:      Effort: Pulmonary effort is normal.      Breath sounds: Normal breath sounds.     Musculoskeletal:         General: Normal " range of motion.     Skin:     General: Skin is warm.     Neurological:      Mental Status: She is alert and oriented to person, place, and time.      Deep Tendon Reflexes: Reflexes normal.      Comments: No tremors to outstretched hands     Psychiatric:         Mood and Affect: Mood normal.       Lab Review                                      Imaging  BONE DENSITY SCAN 5/23/23  FN -2.0  Right hip -2.1  Total hip -1.7  LS -2.9        Assessment/Plan   55-year-old female presents for follow up for Tulio's disease which was diagnosed in 2000 and Hashimoto's thyroiditis.     She is found to have osteoporosis as of May 2023    Tulio disease (Multi)  To continue Florinef 0.1mg once daily but change to bedtime   To continue Hydrocortisone 10mg at 8AM, 10pm at 12pm and 5mg at 5pm   To double doses of Hydrocortisone in the event of an acute illness  To get a new medical alert bracelet   To obtain blood tests       Hypothyroidism due to Hashimoto's thyroiditis  To continue Synthroid 112mcg po daily  Take Synthroid on an empty stomach with water alone, 30-60 minutes before eating or taking other medications, 4 hours before any calcium or iron supplement.  To obtain blood tests       Osteoporosis  To continue Fosamax 70mg once weekly     For follow up in 6 months

## 2025-08-08 NOTE — PATIENT INSTRUCTIONS
Thank you for choosing [facility]  for your health care needs.  If you have any questions, concerns or medical needs, please feel free to contact our office at [phone].    Please ensure you complete your blood work one week before the next scheduled appointment.    To continue Synthroid 112mcg po daily  Take Synthroid on an empty stomach with water alone, 30-60 minutes before eating or taking other medications, 4 hours before any calcium or iron supplement.  To continue Florinef 0.1mg once daily but change to bedtime   To continue Hydrocortisone 10mg at 8AM, 10pm at 12pm and 5mg at 5pm   To double doses of Hydrocortisone in the event of an acute illness  To get a new medical alert bracelet   To obtain blood tests   To continue Fosamax 70mg once weekly   For follow up in 6 months

## 2025-08-09 RX ORDER — HYDROCORTISONE 5 MG/1
TABLET ORAL
Qty: 500 TABLET | Refills: 3 | Status: SHIPPED | OUTPATIENT
Start: 2025-08-09

## 2025-08-09 ASSESSMENT — ENCOUNTER SYMPTOMS: DIZZINESS: 1

## 2025-08-10 NOTE — ASSESSMENT & PLAN NOTE
To continue Synthroid 112mcg po daily  Take Synthroid on an empty stomach with water alone, 30-60 minutes before eating or taking other medications, 4 hours before any calcium or iron supplement.  To obtain blood tests

## 2025-08-10 NOTE — ASSESSMENT & PLAN NOTE
To continue Florinef 0.1mg once daily but change to bedtime   To continue Hydrocortisone 10mg at 8AM, 10pm at 12pm and 5mg at 5pm   To double doses of Hydrocortisone in the event of an acute illness  To get a new medical alert bracelet   To obtain blood tests

## 2025-08-11 DIAGNOSIS — E06.3 HYPOTHYROIDISM DUE TO HASHIMOTO'S THYROIDITIS: ICD-10-CM

## 2025-08-11 DIAGNOSIS — E27.1 ADDISON DISEASE (MULTI): ICD-10-CM

## 2025-08-11 RX ORDER — LEVOTHYROXINE SODIUM 112 UG/1
112 TABLET ORAL DAILY
Qty: 90 TABLET | Refills: 1 | Status: SHIPPED | OUTPATIENT
Start: 2025-08-11 | End: 2025-08-11

## 2025-08-11 RX ORDER — LEVOTHYROXINE SODIUM 112 UG/1
112 TABLET ORAL DAILY
Qty: 90 TABLET | Refills: 1 | Status: SHIPPED | OUTPATIENT
Start: 2025-08-11 | End: 2026-02-07

## 2025-08-28 ENCOUNTER — APPOINTMENT (OUTPATIENT)
Dept: PRIMARY CARE | Facility: CLINIC | Age: 55
End: 2025-08-28
Payer: COMMERCIAL

## 2025-09-02 DIAGNOSIS — E27.1 ADDISON DISEASE (MULTI): ICD-10-CM

## 2025-09-03 RX ORDER — HYDROCORTISONE 5 MG/1
TABLET ORAL
Qty: 500 TABLET | Refills: 3 | Status: SHIPPED | OUTPATIENT
Start: 2025-09-03

## 2026-02-24 ENCOUNTER — APPOINTMENT (OUTPATIENT)
Dept: ENDOCRINOLOGY | Facility: CLINIC | Age: 56
End: 2026-02-24
Payer: COMMERCIAL

## 2026-02-26 ENCOUNTER — APPOINTMENT (OUTPATIENT)
Dept: ENDOCRINOLOGY | Facility: CLINIC | Age: 56
End: 2026-02-26
Payer: COMMERCIAL